# Patient Record
Sex: FEMALE | Race: ASIAN | NOT HISPANIC OR LATINO | URBAN - METROPOLITAN AREA
[De-identification: names, ages, dates, MRNs, and addresses within clinical notes are randomized per-mention and may not be internally consistent; named-entity substitution may affect disease eponyms.]

---

## 2017-07-13 ENCOUNTER — EMERGENCY (EMERGENCY)
Facility: HOSPITAL | Age: 47
LOS: 1 days | Discharge: HOME | End: 2017-07-13
Attending: EMERGENCY MEDICINE | Admitting: EMERGENCY MEDICINE
Payer: COMMERCIAL

## 2017-07-13 VITALS
DIASTOLIC BLOOD PRESSURE: 85 MMHG | RESPIRATION RATE: 19 BRPM | SYSTOLIC BLOOD PRESSURE: 130 MMHG | TEMPERATURE: 98 F | OXYGEN SATURATION: 100 % | HEART RATE: 78 BPM | WEIGHT: 130.07 LBS

## 2017-07-13 LAB
ALBUMIN SERPL ELPH-MCNC: 4.2 G/DL — SIGNIFICANT CHANGE UP (ref 3.3–5)
ALP SERPL-CCNC: 70 U/L — SIGNIFICANT CHANGE UP (ref 40–120)
ALT FLD-CCNC: 9 U/L — SIGNIFICANT CHANGE UP (ref 4–33)
AST SERPL-CCNC: 17 U/L — SIGNIFICANT CHANGE UP (ref 4–32)
BASOPHILS # BLD AUTO: 0.05 K/UL — SIGNIFICANT CHANGE UP (ref 0–0.2)
BASOPHILS NFR BLD AUTO: 0.6 % — SIGNIFICANT CHANGE UP (ref 0–2)
BILIRUB SERPL-MCNC: 0.3 MG/DL — SIGNIFICANT CHANGE UP (ref 0.2–1.2)
BLD GP AB SCN SERPL QL: NEGATIVE — SIGNIFICANT CHANGE UP
BUN SERPL-MCNC: 12 MG/DL — SIGNIFICANT CHANGE UP (ref 7–23)
CALCIUM SERPL-MCNC: 8.7 MG/DL — SIGNIFICANT CHANGE UP (ref 8.4–10.5)
CHLORIDE SERPL-SCNC: 104 MMOL/L — SIGNIFICANT CHANGE UP (ref 98–107)
CO2 SERPL-SCNC: 26 MMOL/L — SIGNIFICANT CHANGE UP (ref 22–31)
CREAT SERPL-MCNC: 0.69 MG/DL — SIGNIFICANT CHANGE UP (ref 0.5–1.3)
EOSINOPHIL # BLD AUTO: 0.43 K/UL — SIGNIFICANT CHANGE UP (ref 0–0.5)
EOSINOPHIL NFR BLD AUTO: 5.1 % — SIGNIFICANT CHANGE UP (ref 0–6)
GLUCOSE SERPL-MCNC: 94 MG/DL — SIGNIFICANT CHANGE UP (ref 70–99)
HBA1C BLD-MCNC: 5.8 % — HIGH (ref 4–5.6)
HCT VFR BLD CALC: 24 % — LOW (ref 34.5–45)
HGB BLD-MCNC: 6.9 G/DL — CRITICAL LOW (ref 11.5–15.5)
IMM GRANULOCYTES # BLD AUTO: 0.03 # — SIGNIFICANT CHANGE UP
IMM GRANULOCYTES NFR BLD AUTO: 0.4 % — SIGNIFICANT CHANGE UP (ref 0–1.5)
IRON SATN MFR SERPL: 14 UG/DL — LOW (ref 30–160)
IRON SATN MFR SERPL: 394 UG/DL — SIGNIFICANT CHANGE UP (ref 140–530)
LYMPHOCYTES # BLD AUTO: 3.52 K/UL — HIGH (ref 1–3.3)
LYMPHOCYTES # BLD AUTO: 41.4 % — SIGNIFICANT CHANGE UP (ref 13–44)
MCHC RBC-ENTMCNC: 18.4 PG — LOW (ref 27–34)
MCHC RBC-ENTMCNC: 28.8 % — LOW (ref 32–36)
MCV RBC AUTO: 64 FL — LOW (ref 80–100)
MONOCYTES # BLD AUTO: 0.7 K/UL — SIGNIFICANT CHANGE UP (ref 0–0.9)
MONOCYTES NFR BLD AUTO: 8.2 % — SIGNIFICANT CHANGE UP (ref 2–14)
NEUTROPHILS # BLD AUTO: 3.78 K/UL — SIGNIFICANT CHANGE UP (ref 1.8–7.4)
NEUTROPHILS NFR BLD AUTO: 44.3 % — SIGNIFICANT CHANGE UP (ref 43–77)
NRBC # FLD: 0 — SIGNIFICANT CHANGE UP
OB PNL STL: NEGATIVE — SIGNIFICANT CHANGE UP
PLATELET # BLD AUTO: 502 K/UL — HIGH (ref 150–400)
PMV BLD: 10.1 FL — SIGNIFICANT CHANGE UP (ref 7–13)
POTASSIUM SERPL-MCNC: 3.7 MMOL/L — SIGNIFICANT CHANGE UP (ref 3.5–5.3)
POTASSIUM SERPL-SCNC: 3.7 MMOL/L — SIGNIFICANT CHANGE UP (ref 3.5–5.3)
PROT SERPL-MCNC: 7.7 G/DL — SIGNIFICANT CHANGE UP (ref 6–8.3)
RBC # BLD: 3.75 M/UL — LOW (ref 3.8–5.2)
RBC # FLD: 19.1 % — HIGH (ref 10.3–14.5)
RETICS #: 39.9 10X3/UL — SIGNIFICANT CHANGE UP (ref 17–73)
RETICS/RBC NFR: 1.1 % — SIGNIFICANT CHANGE UP (ref 0.5–2.5)
REVIEW TO FOLLOW: YES — SIGNIFICANT CHANGE UP
RH IG SCN BLD-IMP: POSITIVE — SIGNIFICANT CHANGE UP
RH IG SCN BLD-IMP: POSITIVE — SIGNIFICANT CHANGE UP
SODIUM SERPL-SCNC: 141 MMOL/L — SIGNIFICANT CHANGE UP (ref 135–145)
UIBC SERPL-MCNC: 380 UG/DL — HIGH (ref 110–370)
WBC # BLD: 8.51 K/UL — SIGNIFICANT CHANGE UP (ref 3.8–10.5)
WBC # FLD AUTO: 8.51 K/UL — SIGNIFICANT CHANGE UP (ref 3.8–10.5)

## 2017-07-13 PROCEDURE — 99220: CPT | Mod: 25

## 2017-07-13 NOTE — ED CDU PROVIDER NOTE - OBJECTIVE STATEMENT
Jose Antonio att: 47F h/o anemia nos, fibroid sent in by pcp for symptomatic anemia and hb 6.7. Patient reports hb trended "for years" by pcp. Lately patient feels lighteaded, cannot stand for prolonged periods, and feels sob with rest or walking. PCP gabi labs Hb 6.7. Denies loc, cp, palpitations, melena, brpbr, menorrhagia, prior prbc transfusion, chemo. CDU for 2 unit prbc transfusion. Jose Antonio att: 47F h/o anemia nos, fibroid sent in by pcp for symptomatic anemia and hb 6.7. Patient reports hb trended "for years" by pcp. Lately patient feels lighteaded, cannot stand for prolonged periods, and feels sob with rest or walking. PCP gabi labs Hb 6.7. Denies loc, cp, palpitations, melena, brpbr, menorrhagia, prior prbc transfusion, chemo. CDU for 2 unit prbc transfusion.    CDU JAVI Salas Note-----  ED Provider Note reviewed per above; pt is a 46 yo female with PMH of anemia (no prior hx/o transfusion) and fibroids, who presented to the ED as noted above.  Pt. was evaluated in the ED and sent to CDU for transfusion of PRBC’s per orders, repeat labs, observation and reassessment.  On CDU arrival, pt has no active c/o; pt denies hx/o chest pain or syncope.  No active vaginal bleeding / spotting.  CDU plan discussed with pt who verbalizes agreement with plan.

## 2017-07-13 NOTE — ED PROVIDER NOTE - PROGRESS NOTE DETAILS
I performed a face to face bedside interview with patient regarding history of present illness, review of symptoms and past medical history. I completed an independent physical exam.  I have discussed patient's plan of care with PA.   I agree with note as stated above, having amended the EMR as needed to reflect my findings. I have discussed the assessment and plan of care.  This includes during the time I functioned as the attending physician for this patient.  pt w/o acute complaint  here for transfusion for symptomatic anemia  with weakness and COPE on climbing stairs for several mos  exam here  clear lungs  card RRR S1S@  abd nontender  fullness low abd  received TF  repeat CBC pending

## 2017-07-13 NOTE — ED CDU PROVIDER NOTE - INDICATION FOR OBSERVATION
Therapeutic Intensity Therapeutic Intensity/Transfusion of PRBC’s per orders, repeat labs, observation and reassessment.

## 2017-07-13 NOTE — ED PROVIDER NOTE - PHYSICAL EXAMINATION
Jose Antonio att: nad, aaox3, ctabl, rrr, s1s2, abd ntnd large fibroid palpable to level of umbilicus, neg le edema

## 2017-07-13 NOTE — ED PROVIDER NOTE - ATTENDING CONTRIBUTION TO CARE
Dr. Brady: I performed a face to face bedside interview with patient regarding history of present illness, review of symptoms and past medical history. I completed an independent physical exam.  I have discussed patient's plan of care with PA.   I agree with note as stated above, having amended the EMR as needed to reflect my findings.   This includes HISTORY OF PRESENT ILLNESS, HIV, PAST MEDICAL/SURGICAL/FAMILY/SOCIAL HISTORY, ALLERGIES AND HOME MEDICATIONS, REVIEW OF SYSTEMS, PHYSICAL EXAM, and any PROGRESS NOTES during the time I functioned as the attending physician for this patient. HPI above as by me. PE above as by me. DDX symptomatic anemia PLAN cbc diff, cmp, hcg, occult, prbc transfuse x 2 units in CDU. TIBC, ferritin, retic drawn for outpatient hematology w/up. Patient not actively vag or rectal bleeding, if occult positive, outpatient gi workup.

## 2017-07-13 NOTE — ED ADULT NURSE NOTE - OBJECTIVE STATEMENT
Pt received into intake room 2 sent by PCP for blood transfusion. Pt states she had blood work done and had HGB of 6.7; pt also co generalized weakness, fatigue, SOB, light headed. Pt has PMH anemia, refused blood transfusions in past. Pt A&Ox4, in NAD. MD evaluated, VS as noted, 20 G Iv inserted into R AC, labs sent. Family at bedside, call bell within reach, comfort measures provided, will continue to monitor for safety and comfort.

## 2017-07-13 NOTE — ED PROVIDER NOTE - OBJECTIVE STATEMENT
47 year old female PMHx of fibroids (not actively bleeding) sent in by her PCP for hbg of 6.7 pw generalized weakness, lightheadedness SOB, fatigue. The patient has had these symptoms for several months - never has been transfused because the patient has refused. Her PCP has been trending her H/H and last month it was 7 - two months ago it was 6.2. LMP 7/4/2017. Denies n/v/f/c, CP, abdominal pain, melena, hematochezia, dysuria, hematuria, vaginal bleeding. 47 year old female PMHx of fibroids (not actively bleeding) sent in by her PCP for hbg of 6.7 pw generalized weakness, lightheadedness SOB, fatigue. The patient has had these symptoms for several months - never has been transfused because the patient has refused. Her PCP has been trending her H/H and last month it was 7 - two months ago it was 6.2. LMP 7/4/2017. Denies n/v/f/c, CP, abdominal pain, melena, hematochezia, dysuria, hematuria, vaginal bleeding.    Jose Antonio att: 47F h/o anemia nos, fibroid sent in by pcp for symptomatic anemia and hb 6.7. Patient reports hb trended "for years" by pcp. Lately patient feels lighteaded, cannot stand for prolonged periods, and feels sob with rest or walking. PCP gabi labs Hb 6.7. Denies loc, cp, palpitations, melena, brpbr, menorrhagia, prior prbc transfusion, chemo.

## 2017-07-13 NOTE — ED CDU PROVIDER NOTE - PROGRESS NOTE DETAILS
CDU PA Josue Addendum----  Pt. resting comfortably in interim; no issues to date; will continue to monitor / reassess.  Pt. will be signed out to CDU day PA and attending at 0700 hrs. Pepe Gordon: Pt resting comfortably. H/H improved- to 10.5/ 34.7. Pt feeling better. Pt ambulating with no difficulty. Pt stable for discharge and to follow up with Dr. Maame chun. Carla attending I performed a face to face bedside interview with patient regarding history of present illness, review of symptoms and past medical history. I completed an independent physical exam.  I have discussed patient's plan of care with PA.   I agree with note as stated above, having amended the EMR as needed to reflect my findings. I have discussed the assessment and plan of care.  This includes during the time I functioned as the attending physician for this patient.  pt inCDU for symptomatic anemia    c/o several mos of COPE  no acute change in sxs  Pt with h/o fibroid uterus  exam  pt in NAD clear lungs  card RRR S1S2  abd slight  low abd fullness  nontender  ambulatory in ED  EKG  shallow ant TW  no old for comp  bedside ECHO  nl LV size and function   no sign of RV strain Locurto attending  pt s/p transfusion  feels improved  will follow up with outpt MD Dunbar for discharge

## 2017-07-13 NOTE — ED PROVIDER NOTE - MEDICAL DECISION MAKING DETAILS
47 year old female PMHx of fibroids (not actively bleeding) sent in by her PCP for hbg of 6.7 pw generalized weakness, lightheadedness SOB, fatigue.   Plan: cbc, cmp, type and screen, fecal occult

## 2017-07-14 VITALS
SYSTOLIC BLOOD PRESSURE: 106 MMHG | HEART RATE: 56 BPM | RESPIRATION RATE: 16 BRPM | OXYGEN SATURATION: 98 % | TEMPERATURE: 99 F | DIASTOLIC BLOOD PRESSURE: 65 MMHG

## 2017-07-14 LAB
ANISOCYTOSIS BLD QL: SIGNIFICANT CHANGE UP
BASOPHILS # BLD AUTO: 0.1 K/UL — SIGNIFICANT CHANGE UP (ref 0–0.2)
BASOPHILS NFR BLD AUTO: 1.4 % — SIGNIFICANT CHANGE UP (ref 0–2)
DACRYOCYTES BLD QL SMEAR: SLIGHT — SIGNIFICANT CHANGE UP
EOSINOPHIL # BLD AUTO: 0.33 K/UL — SIGNIFICANT CHANGE UP (ref 0–0.5)
EOSINOPHIL NFR BLD AUTO: 4.7 % — SIGNIFICANT CHANGE UP (ref 0–6)
FERRITIN SERPL-MCNC: 5.15 NG/ML — LOW (ref 15–150)
HCG SERPL-ACNC: < 5 MIU/ML — SIGNIFICANT CHANGE UP
HCT VFR BLD CALC: 34.7 % — SIGNIFICANT CHANGE UP (ref 34.5–45)
HGB BLD-MCNC: 10.5 G/DL — LOW (ref 11.5–15.5)
HYPOCHROMIA BLD QL: SIGNIFICANT CHANGE UP
IMM GRANULOCYTES # BLD AUTO: 0.02 # — SIGNIFICANT CHANGE UP
IMM GRANULOCYTES NFR BLD AUTO: 0.3 % — SIGNIFICANT CHANGE UP (ref 0–1.5)
LYMPHOCYTES # BLD AUTO: 2.82 K/UL — SIGNIFICANT CHANGE UP (ref 1–3.3)
LYMPHOCYTES # BLD AUTO: 39.8 % — SIGNIFICANT CHANGE UP (ref 13–44)
MANUAL SMEAR VERIFICATION: YES — SIGNIFICANT CHANGE UP
MCHC RBC-ENTMCNC: 21 PG — LOW (ref 27–34)
MCHC RBC-ENTMCNC: 30.3 % — LOW (ref 32–36)
MCV RBC AUTO: 69.5 FL — LOW (ref 80–100)
MICROCYTES BLD QL: SIGNIFICANT CHANGE UP
MONOCYTES # BLD AUTO: 0.63 K/UL — SIGNIFICANT CHANGE UP (ref 0–0.9)
MONOCYTES NFR BLD AUTO: 8.9 % — SIGNIFICANT CHANGE UP (ref 2–14)
NEUTROPHILS # BLD AUTO: 3.19 K/UL — SIGNIFICANT CHANGE UP (ref 1.8–7.4)
NEUTROPHILS NFR BLD AUTO: 44.9 % — SIGNIFICANT CHANGE UP (ref 43–77)
NRBC # FLD: 0 — SIGNIFICANT CHANGE UP
PLATELET # BLD AUTO: 446 K/UL — HIGH (ref 150–400)
PLATELET COUNT - ESTIMATE: SIGNIFICANT CHANGE UP
PMV BLD: 10.4 FL — SIGNIFICANT CHANGE UP (ref 7–13)
POIKILOCYTOSIS BLD QL AUTO: SLIGHT — SIGNIFICANT CHANGE UP
RBC # BLD: 4.99 M/UL — SIGNIFICANT CHANGE UP (ref 3.8–5.2)
RBC # FLD: 23.5 % — HIGH (ref 10.3–14.5)
REVIEW TO FOLLOW: YES — SIGNIFICANT CHANGE UP
WBC # BLD: 7.09 K/UL — SIGNIFICANT CHANGE UP (ref 3.8–10.5)
WBC # FLD AUTO: 7.09 K/UL — SIGNIFICANT CHANGE UP (ref 3.8–10.5)

## 2017-07-14 PROCEDURE — 99217: CPT

## 2017-07-14 PROCEDURE — 93308 TTE F-UP OR LMTD: CPT | Mod: 26

## 2017-07-14 RX ORDER — CYCLOBENZAPRINE HYDROCHLORIDE 10 MG/1
10 TABLET, FILM COATED ORAL ONCE
Qty: 0 | Refills: 0 | Status: COMPLETED | OUTPATIENT
Start: 2017-07-14 | End: 2017-07-14

## 2017-07-14 RX ADMIN — CYCLOBENZAPRINE HYDROCHLORIDE 10 MILLIGRAM(S): 10 TABLET, FILM COATED ORAL at 03:41

## 2017-07-14 NOTE — ED PROCEDURE NOTE - PROCEDURE ADDITIONAL DETAILS
normal LV function no sign of RV strain   no pericardial effusion    RV free wall velocity nl  RV size wnl

## 2018-10-31 PROBLEM — D64.9 ANEMIA, UNSPECIFIED: Chronic | Status: ACTIVE | Noted: 2017-07-13

## 2018-10-31 PROBLEM — D25.9 LEIOMYOMA OF UTERUS, UNSPECIFIED: Chronic | Status: ACTIVE | Noted: 2017-07-13

## 2018-11-05 ENCOUNTER — OUTPATIENT (OUTPATIENT)
Dept: OUTPATIENT SERVICES | Facility: HOSPITAL | Age: 48
LOS: 1 days | End: 2018-11-05
Payer: SELF-PAY

## 2018-11-05 ENCOUNTER — APPOINTMENT (OUTPATIENT)
Dept: MRI IMAGING | Facility: CLINIC | Age: 48
End: 2018-11-05

## 2018-11-05 DIAGNOSIS — Z00.8 ENCOUNTER FOR OTHER GENERAL EXAMINATION: ICD-10-CM

## 2018-11-05 PROCEDURE — 73721 MRI JNT OF LWR EXTRE W/O DYE: CPT | Mod: 26,RT

## 2018-11-05 PROCEDURE — 73721 MRI JNT OF LWR EXTRE W/O DYE: CPT

## 2020-11-16 ENCOUNTER — APPOINTMENT (OUTPATIENT)
Dept: OBGYN | Facility: CLINIC | Age: 50
End: 2020-11-16
Payer: COMMERCIAL

## 2020-11-16 ENCOUNTER — RESULT REVIEW (OUTPATIENT)
Age: 50
End: 2020-11-16

## 2020-11-16 PROCEDURE — 99072 ADDL SUPL MATRL&STAF TM PHE: CPT

## 2020-11-16 PROCEDURE — 99203 OFFICE O/P NEW LOW 30 MIN: CPT

## 2022-06-20 ENCOUNTER — APPOINTMENT (OUTPATIENT)
Dept: ORTHOPEDIC SURGERY | Facility: CLINIC | Age: 52
End: 2022-06-20
Payer: COMMERCIAL

## 2022-06-20 VITALS — BODY MASS INDEX: 24.74 KG/M2 | WEIGHT: 126 LBS | HEIGHT: 60 IN

## 2022-06-20 DIAGNOSIS — Z78.9 OTHER SPECIFIED HEALTH STATUS: ICD-10-CM

## 2022-06-20 PROCEDURE — 99203 OFFICE O/P NEW LOW 30 MIN: CPT

## 2022-06-20 PROCEDURE — 99204 OFFICE O/P NEW MOD 45 MIN: CPT

## 2022-06-20 NOTE — IMAGING
[Right] : right knee [All Views] : anteroposterior, lateral, skyline, and anteroposterior standing [There are no fractures, subluxations or dislocations. No significant abnormalities are seen] : There are no fractures, subluxations or dislocations. No significant abnormalities are seen [de-identified] : The patient is a well appearing 52 year year old female of their stated age. \par Patient ambulates with a normal gait. \par Negative straight leg raise bilateral \par \par Effected Knee:   Right                    \par ROM:  0-145 degrees \par Lachman: Negative \par Pivot Shift: Negative \par Anterior Drawer: Negative \par Posterior Drawer / Sag:Negative \par Varus Stress 0 degrees: Stable \par Varus Stress 30 degrees: Stable \par Valgus Stress 0 degrees: Stable \par Valgus Stress 30 degrees: Stable \par Medial Yolande: Negative \par Lateral Yolande: +\par Patella Glide: 2+ \par Patella Apprehension: Negative \par Patella Grind: Negative \par \par Palpation: \par Medial Joint Line: Nontender \par Lateral Joint Line: tender \par Medial Collateral Ligament: Nontender \par Lateral Collateral Ligament/PLC: Nontender \par Distal Femur: Nontender \par Proximal Tibia: Nontender \par Tibial Tubercle: Nontender \par Distal Pole Patella: Nontender \par Quadriceps Tendon: Nontender &  Intact \par Patella Tendon: Nontender &  Intact \par Medial Distal Hamstring/PES: Nontender \par Lateral Distal Hamstring: Nontender & Stable \par Iliotibial Band: tender \par Medial Patellofemoral Ligament: Nontender \par Proximal GSC-Plantaris: Nontender \par Calf: Supple & Nontender \par \par Inspection: \par Deformity: No \par Erythema: No \par Ecchymosis: No \par Abrasions: No \par Effusion: Mild\par Prepatella Bursitis: No \par \par Neurologic Exam: \par Sensation L4-S1: Grossly Intact \par \par Motor Exam: \par Quadriceps: 5 out of 5 \par Hamstrings: 5 out of 5 \par EHL: 5 out of 5 \par FHL: 5 out of 5 \par TA: 5 out of 5 \par GS: 5 out of 5 \par \par Circulatory/Pulses: \par Dorsalis Pedis: 2+ \par Posterior Tibialis: 2+ \par \par Additional Pertinent Findings: None \par \par Contralateral Knee:                                \par ROM: 0-145 degrees \par Other Pertinent Findings: None\par \par Assessment: The patient is a 52 year year old female  with right knee pain s/p slip and fall and radiographic and physical exam findings consistent with possible LMT\par \par The patient’s condition is acute \par Documents/Results Reviewed Today: XRs right knee\par Tests/Studies Independently Interpreted Today:  XRs right knee are normal \par Pertinent findings include:  IT band tenderness, + lateral yolande, lateral joint line tenderness, mild effusion \par Confounding medical conditions/concerns:  None\par \par Plan: Patient will obtain MRI of right knee to eval for LMT. Start HEP and ice.   \par Tests Ordered: MRI right knee to eval for LMT\par Prescription Medications Ordered: None \par Braces/DME Ordered: None \par Activity/Work/Sports Status: OOW \par Additional Instructions:  as tolerated\par \par Follow-Up: after MRI \par \par \par The patient's current medication management of their orthopedic diagnosis was reviewed today:\par (1) We discussed a comprehensive treatment plan that included possible pharmaceutical management involving the use of prescription strength medications including but not limited to options such as oral Naprosyn 500mg BID, once daily Meloxicam 15 mg, or 500-650 mg Tylenol versus over the counter oral medications and topical prescription NSAID Pennsaid vs over the counter Voltaren gel.\par (2) There is a moderate risk of morbidity with further treatment, especially from use of prescription strength medications and possible side effects of these medications which include upset stomach with oral medications, skin reactions to topical medications and cardiac/renal issues with long term use.\par (3) I recommended that the patient follow-up with their medical physician to discuss any significant specific potential issues with long term medication use such as interactions with current medications or with exacerbation of underlying medical comorbidities.\par (4) The benefits and risks associated with use of injectable, oral or topical, prescription and over the counter anti-inflammatory medications were discussed with the patient. The patient voiced understanding of the risks including but not limited to bleeding, stroke, kidney dysfunction, heart disease, and were referred to the black box warning label for further information.\par \par Rosi RESTREPO attest that this documentation has been prepared under the direction and in the presence of provider Dr. Cleveland.\par \par The documentation recorded by the scribe accurately reflects the service I personally performed and the decisions made by me.\par

## 2022-06-20 NOTE — HISTORY OF PRESENT ILLNESS
[de-identified] : The patient is a 52 year old right hand dominant female  who presents today complaining of right knee pain.  \par Date of Injury/Onset: 3 weeks ago\par Pain:    At Rest: 4/10 \par With Activity:  7/10 \par Mechanism of injury: slipped and fell on water and fell on her left knee\par This is a Work Related Injury being treated under Worker's Compensation.\par This is [not] an athletic injury occurring associated with an interscholastic or organized sports team.\par Quality of symptoms: Sharp pain over lateral knee\par Improves with: rest\par Worse with: Pain is constant, lying on involved side, walking, running, increased activity\par Prior treatment: [facility]\par Prior Imaging:X-ray\par Reports Available For Review Today: None\par Out of work/sport: [Yes], since friday\par School/Sport/Position/Occupation: Dialysis nurse\par Additional Information: [None]

## 2022-07-11 ENCOUNTER — APPOINTMENT (OUTPATIENT)
Dept: ORTHOPEDIC SURGERY | Facility: CLINIC | Age: 52
End: 2022-07-11

## 2022-07-11 VITALS — WEIGHT: 126 LBS | HEIGHT: 60 IN | BODY MASS INDEX: 24.74 KG/M2

## 2022-07-11 PROCEDURE — 99072 ADDL SUPL MATRL&STAF TM PHE: CPT

## 2022-07-11 PROCEDURE — 99214 OFFICE O/P EST MOD 30 MIN: CPT

## 2022-07-11 NOTE — HISTORY OF PRESENT ILLNESS
[de-identified] : The patient is a 52 year old right hand dominant female  who presents today complaining of right knee pain.  \par Date of Injury/Onset: 10/2021...reinjured 3 weeks ago\par Pain:    At Rest: 4/10 \par With Activity:  7/10 \par Mechanism of injury: slipped and fell on water and fell on her left knee\par This is a Work Related Injury being treated under Worker's Compensation.\par This is not an athletic injury occurring associated with an interscholastic or organized sports team.\par Quality of symptoms: Sharp pain over lateral knee\par Improves with: rest\par Worse with: Pain is constant, lying on involved side, walking, running, increased activity\par Treatment/Imaging/Studies Since Last Visit: MRI\par 	Reports Available For Review Today: MRI report\par Out of work/sport: out of work since 6/17/22\par School/Sport/Position/Occupation: Dialysis nurse\par Additional Information: none

## 2022-07-11 NOTE — IMAGING
Patient used call light; writer answered; patient asked how long this will all take.  Writer told patient about 30 min for results  Patient stated \"Can I have something to eat\" patient continues to remove blood pressure cuff and pulse oxygen monitor.   Writer informed patient no food or drink till the test results come back.   Patient stated \"if its not done in like 25 min im debating whether or not I'm gonna go to [facility] or not\"  Writer asked patient if he was going to leave and patient stated \"not yet but Im debating on it\"  MD aware   [de-identified] : The patient is a well appearing 52 year year old female of their stated age. \par Patient ambulates with a normal gait. \par Negative straight leg raise bilateral \par \par Effected Knee:   Right                    \par ROM:  0-145 degrees \par Lachman: Negative \par Pivot Shift: Negative \par Anterior Drawer: Negative \par Posterior Drawer / Sag:Negative \par Varus Stress 0 degrees: Stable \par Varus Stress 30 degrees: Stable \par Valgus Stress 0 degrees: Stable \par Valgus Stress 30 degrees: Stable \par Medial Yolande: Negative \par Lateral Yolande: +\par Patella Glide: 2+ \par Patella Apprehension: Negative \par Patella Grind: Negative \par \par Palpation: \par Medial Joint Line: Nontender \par Lateral Joint Line: tender \par Medial Collateral Ligament: Nontender \par Lateral Collateral Ligament/PLC: Nontender \par Distal Femur: Nontender \par Proximal Tibia: Nontender \par Tibial Tubercle: Nontender \par Distal Pole Patella: Nontender \par Quadriceps Tendon: Nontender &  Intact \par Patella Tendon: Nontender &  Intact \par Medial Distal Hamstring/PES: Nontender \par Lateral Distal Hamstring: Nontender & Stable \par Iliotibial Band: tender \par Medial Patellofemoral Ligament: Nontender \par Proximal GSC-Plantaris: Nontender \par Calf: Supple & Nontender \par \par Inspection: \par Deformity: No \par Erythema: No \par Ecchymosis: No \par Abrasions: No \par Effusion: Mild\par Prepatella Bursitis: No \par \par Neurologic Exam: \par Sensation L4-S1: Grossly Intact \par \par Motor Exam: \par Quadriceps: 5 out of 5 \par Hamstrings: 5 out of 5 \par EHL: 5 out of 5 \par FHL: 5 out of 5 \par TA: 5 out of 5 \par GS: 5 out of 5 \par \par Circulatory/Pulses: \par Dorsalis Pedis: 2+ \par Posterior Tibialis: 2+ \par \par Additional Pertinent Findings: None \par \par Contralateral Knee:                                \par ROM: 0-145 degrees \par Other Pertinent Findings: None\par \par Assessment: The patient is a 52 year year old female with right knee pain s/p slip and fall and radiographic and physical exam findings consistent with LMT\par \par The patient’s condition is acute \par Documents/Results Reviewed Today: MRI right knee\par Tests/Studies Independently Interpreted Today:  MRI right knee reveals lateral meniscus tear with parameniscal cyst\par Pertinent findings include:  IT band tenderness, + lateral yolande, lateral joint line tenderness, mild effusion \par Confounding medical conditions/concerns:  None\par \par Plan: Discussed non-operative and operative treatment options including right knee arthroscopic partial lateral meniscectomy with debridement of parameniscal cyst and any indicated procedures. Patient made aware of the risks and benefits of surgical intervention. All questions and concerns regarding the surgery were addressed. Went over the recovery timeline and expected outcomes following surgery. Patient elected to move forward with the surgical procedure.\par Tests Ordered: Preop and Covid testing\par Prescription Medications Ordered: None \par Braces/DME Ordered: None \par Activity/Work/Sports Status: OOW \par Additional Instructions: as tolerated\par Follow-Up: 2wks post-op\par \par \par The patient's current medication management of their orthopedic diagnosis was reviewed today:\par (1) We discussed a comprehensive treatment plan that included possible pharmaceutical management involving the use of prescription strength medications including but not limited to options such as oral Naprosyn 500mg BID, once daily Meloxicam 15 mg, or 500-650 mg Tylenol versus over the counter oral medications and topical prescription NSAID Pennsaid vs over the counter Voltaren gel.\par (2) There is a moderate risk of morbidity with further treatment, especially from use of prescription strength medications and possible side effects of these medications which include upset stomach with oral medications, skin reactions to topical medications and cardiac/renal issues with long term use.\par (3) I recommended that the patient follow-up with their medical physician to discuss any significant specific potential issues with long term medication use such as interactions with current medications or with exacerbation of underlying medical comorbidities.\par (4) The benefits and risks associated with use of injectable, oral or topical, prescription and over the counter anti-inflammatory medications were discussed with the patient. The patient voiced understanding of the risks including but not limited to bleeding, stroke, kidney dysfunction, heart disease, and were referred to the black box warning label for further information.\par \par Consent:  Conservative treatment, nontreatment, nonsurgical intervention and surgical intervention treatment options have been reviewed with the patient.  The patient continues to be symptomatic and has failed conservative treatment, and elects to move forward with surgical intervention.  The patient is indicated for right knee arthroscopic partial lateral meniscectomy with debridement of parameniscal cyst and all indicated procedures. As such the alternatives, benefits and risks, of the above procedure, including but not limited to bleeding, infection, neurovascular injury, loss of limb, loss of life,  DVT, PE, RSD, inability to return to previous level of activity, inability to return to previous level of employment, advancement of or to osteoarthritic changes, joint instability or motion loss, hardware failure or migration, cyst recurrence, failure to resolve all symptoms, failure to return to sports and need for further procedures, as well as specific risk of need for future joint arthroplasty were discussed with the patient and/or their legal guardian who agreed to move forward with surgical intervention.  They have reviewed and signed the consent form today after expressing understanding of the above documented conversation. The patient or their representative will contact my office as instructed on the preoperative instruction sheet they received today to schedule surgery in a timely manner as discussed.\par Over 25 minutes were spent on this encounter including time with the patient and over 15 minutes spent on counseling and coordination of care.\par \par \par \par I, Kevin Broussard, attest that this documentation has been prepared under the direction and in the presence of Provider Dr. Cleveland\par \par The documentation recorded by the scribe accurately reflects the service I personally performed and the decisions made by me.\par \par \par

## 2022-07-11 NOTE — DATA REVIEWED
[MRI] : MRI [Right] : of the right [Knee] : knee [Report was reviewed and noted in the chart] : The report was reviewed and noted in the chart [I independently reviewed and interpreted images and report] : I independently reviewed and interpreted images and report [I reviewed the films/CD and additionally noted] : I reviewed the films/CD and additionally noted [FreeTextEntry1] : lateral meniscus tear with para-meniscal cyst

## 2022-07-22 ENCOUNTER — APPOINTMENT (OUTPATIENT)
Dept: ORTHOPEDIC SURGERY | Facility: CLINIC | Age: 52
End: 2022-07-22

## 2022-07-22 VITALS — HEIGHT: 60 IN | BODY MASS INDEX: 24.74 KG/M2 | WEIGHT: 126 LBS

## 2022-07-22 DIAGNOSIS — M23.302 OTHER MENISCUS DERANGEMENTS, UNSPECIFIED LATERAL MENISCUS, UNSPECIFIED KNEE: ICD-10-CM

## 2022-07-22 PROCEDURE — 99214 OFFICE O/P EST MOD 30 MIN: CPT

## 2022-07-22 NOTE — IMAGING
[de-identified] : The patient is a well appearing 52 year year old female of their stated age. \par Patient ambulates with a normal gait. \par Negative straight leg raise bilateral \par \par Effected Knee:   Right                    \par ROM:  0-145 degrees \par Lachman: Negative \par Pivot Shift: Negative \par Anterior Drawer: Negative \par Posterior Drawer / Sag:Negative \par Varus Stress 0 degrees: Stable \par Varus Stress 30 degrees: Stable \par Valgus Stress 0 degrees: Stable \par Valgus Stress 30 degrees: Stable \par Medial Akiko: Negative \par Lateral Akiko: +\par Patella Glide: 2+ \par Patella Apprehension: Negative \par Patella Grind: Negative \par \par Palpation: \par Medial Joint Line: Nontender \par Lateral Joint Line: tender \par Medial Collateral Ligament: Nontender \par Lateral Collateral Ligament/PLC: Nontender \par Distal Femur: Nontender \par Proximal Tibia: Nontender \par Tibial Tubercle: Nontender \par Distal Pole Patella: Nontender \par Quadriceps Tendon: Nontender &  Intact \par Patella Tendon: Nontender &  Intact \par Medial Distal Hamstring/PES: Nontender \par Lateral Distal Hamstring: Nontender & Stable \par Iliotibial Band: tender \par Medial Patellofemoral Ligament: Nontender \par Proximal GSC-Plantaris: Nontender \par Calf: Supple & Nontender \par \par Inspection: \par Deformity: No \par Erythema: No \par Ecchymosis: No \par Abrasions: No \par Effusion: Mild\par Prepatella Bursitis: No \par \par Neurologic Exam: \par Sensation L4-S1: Grossly Intact \par \par Motor Exam: \par Quadriceps: 5 out of 5 \par Hamstrings: 5 out of 5 \par EHL: 5 out of 5 \par FHL: 5 out of 5 \par TA: 5 out of 5 \par GS: 5 out of 5 \par \par Circulatory/Pulses: \par Dorsalis Pedis: 2+ \par Posterior Tibialis: 2+ \par \par Additional Pertinent Findings: None \par \par Contralateral Knee:                                \par ROM: 0-145 degrees \par Other Pertinent Findings: None\par \par Assessment: The patient is a 52 year year old female with right knee pain s/p slip and fall and radiographic and physical exam findings consistent with LMT\par \par The patient’s condition is acute \par Documents/Results Reviewed Today: MRI right knee\par Tests/Studies Independently Interpreted Today:  MRI right knee reveals lateral meniscus tear with parameniscal cyst\par Pertinent findings include:  IT band tenderness, + lateral akiko, lateral joint line tenderness, mild effusion \par Confounding medical conditions/concerns:  None\par \par Plan: Discussed non-operative and operative treatment options including right knee arthroscopic partial lateral meniscectomy with debridement of parameniscal cyst and any indicated procedures. Patient made aware of the risks and benefits of surgical intervention. All questions and concerns regarding the surgery were addressed. Went over the recovery timeline and expected outcomes following surgery. Patient elected to move forward with the surgical procedure.\par Tests Ordered: Preop and Covid testing\par Prescription Medications Ordered: None \par Braces/DME Ordered: None \par Activity/Work/Sports Status: OOW \par Additional Instructions: as tolerated\par Follow-Up: 2wks post-op\par \par \par The patient's current medication management of their orthopedic diagnosis was reviewed today:\par (1) We discussed a comprehensive treatment plan that included possible pharmaceutical management involving the use of prescription strength medications including but not limited to options such as oral Naprosyn 500mg BID, once daily Meloxicam 15 mg, or 500-650 mg Tylenol versus over the counter oral medications and topical prescription NSAID Pennsaid vs over the counter Voltaren gel.\par (2) There is a moderate risk of morbidity with further treatment, especially from use of prescription strength medications and possible side effects of these medications which include upset stomach with oral medications, skin reactions to topical medications and cardiac/renal issues with long term use.\par (3) I recommended that the patient follow-up with their medical physician to discuss any significant specific potential issues with long term medication use such as interactions with current medications or with exacerbation of underlying medical comorbidities.\par (4) The benefits and risks associated with use of injectable, oral or topical, prescription and over the counter anti-inflammatory medications were discussed with the patient. The patient voiced understanding of the risks including but not limited to bleeding, stroke, kidney dysfunction, heart disease, and were referred to the black box warning label for further information.\par \par Consent:  Conservative treatment, nontreatment, nonsurgical intervention and surgical intervention treatment options have been reviewed with the patient.  The patient continues to be symptomatic and has failed conservative treatment, and elects to move forward with surgical intervention.  The patient is indicated for right knee arthroscopic partial lateral meniscectomy with debridement of parameniscal cyst and all indicated procedures. As such the alternatives, benefits and risks, of the above procedure, including but not limited to bleeding, infection, neurovascular injury, loss of limb, loss of life,  DVT, PE, RSD, inability to return to previous level of activity, inability to return to previous level of employment, advancement of or to osteoarthritic changes, joint instability or motion loss, hardware failure or migration, cyst recurrence, failure to resolve all symptoms, failure to return to sports and need for further procedures, as well as specific risk of need for future joint arthroplasty were discussed with the patient and/or their legal guardian who agreed to move forward with surgical intervention.  They have reviewed and signed the consent form today after expressing understanding of the above documented conversation. The patient or their representative will contact my office as instructed on the preoperative instruction sheet they received today to schedule surgery in a timely manner as discussed.\par Over 25 minutes were spent on this encounter including time with the patient and over 15 minutes spent on counseling and coordination of care.\par \par I, Kasia Haddad, attest that this documentation has been prepared under the direction and in the presence of Provider Dr. Kristian Cleveland.\par \par The documentation recorded by the scribe accurately reflects the service I personally performed and the decisions made by me.\par \par

## 2022-07-22 NOTE — HISTORY OF PRESENT ILLNESS
[de-identified] : The patient is a 52 year old right hand dominant female  who presents today complaining of right knee pain.  \par Date of Injury/Onset: 10/2021...reinjured 3 weeks ago\par Pain:    At Rest: 4/10 \par With Activity:  7/10 \par Mechanism of injury: slipped and fell on water and fell on her left knee\par This is not Work Related Injury being treated under Worker's Compensation.\par This is not an athletic injury occurring associated with an interscholastic or organized sports team.\par Quality of symptoms: Sharp pain over lateral knee\par Improves with: rest\par Worse with: Pain is constant, lying on involved side, walking, running, increased activity\par Treatment/Imaging/Studies Since Last Visit: None\par 	Reports Available For Review Today: None\par Out of work/sport: out of work since 6/17/22\par School/Sport/Position/Occupation: Dialysis nurse\par Additional Information: none

## 2022-08-08 RX ORDER — HYDROCODONE BITARTRATE AND ACETAMINOPHEN 5; 325 MG/1; MG/1
5-325 TABLET ORAL
Qty: 30 | Refills: 0 | Status: ACTIVE | COMMUNITY
Start: 2022-08-08 | End: 1900-01-01

## 2022-08-08 RX ORDER — ONDANSETRON 4 MG/1
4 TABLET ORAL
Qty: 15 | Refills: 0 | Status: ACTIVE | COMMUNITY
Start: 2022-08-08 | End: 1900-01-01

## 2022-08-08 RX ORDER — DOCUSATE SODIUM 100 MG/1
100 CAPSULE ORAL 3 TIMES DAILY
Qty: 21 | Refills: 0 | Status: ACTIVE | COMMUNITY
Start: 2022-08-08 | End: 1900-01-01

## 2022-08-09 ENCOUNTER — APPOINTMENT (OUTPATIENT)
Dept: ORTHOPEDIC SURGERY | Facility: AMBULATORY SURGERY CENTER | Age: 52
End: 2022-08-09

## 2022-08-09 PROCEDURE — 29881 ARTHRS KNE SRG MNISECTMY M/L: CPT | Mod: AS,RT

## 2022-08-09 PROCEDURE — 29881 ARTHRS KNE SRG MNISECTMY M/L: CPT | Mod: RT

## 2022-08-22 ENCOUNTER — APPOINTMENT (OUTPATIENT)
Dept: ORTHOPEDIC SURGERY | Facility: CLINIC | Age: 52
End: 2022-08-22

## 2022-08-22 VITALS — BODY MASS INDEX: 24.74 KG/M2 | WEIGHT: 126 LBS | HEIGHT: 60 IN

## 2022-08-22 PROCEDURE — 99024 POSTOP FOLLOW-UP VISIT: CPT

## 2022-08-22 NOTE — PHYSICAL EXAM
[de-identified] : The patient is a well appearing 52 year old F of their stated age.\par \par Surgical site: Right knee\par  \par Incision sites: Well approximated, clean, dry, intact, without drainage, without erythema\par  \par Range of motion: 0-125\par  \par Motor Testin-/5 quad\par  \par Stability Testing: Limited by pain\par  \par Swelling/Effusion: Trace\par  \par Tenderness to palpation: None\par  \par Provocative testing: Limited by pain\par  \par Right Calf: soft and nontender\par Left Calf: soft and nontender\par  \par Neurovascular Examination: Grossly intact, 2+ distal pulses\par Contralateral Extremity: Examination grossly benign\par \par \par Assessment & Plan: The patient is approximately 2 weeks s/p right knee examination under anesthesia, arthroscopic partial lateral meniscectomy with decompression of parameniscal cyst with interval improvement (22). Sutures removed and Steri Strips applied today. The patient is instructed in wound management. The patient's post-op plan, protocol and activity modifications have been thoroughly discussed and the patient expressed understanding. Patient will continue physical therapy. The patient will control pain as discussed. The patient otherwise may advance activity as discussed. They will remain OOW. F/u 4wks. \par \par \par The patient's current medication management of their orthopedic diagnosis was reviewed today:\par (1) We discussed a comprehensive treatment plan that included possible pharmaceutical management involving the use of prescription strength medications including but not limited to options such as oral Naprosyn 500mg BID, once daily Meloxicam 15 mg, or 500-650 mg Tylenol versus over the counter oral medications and topical prescription NSAID Pennsaid vs over the counter Voltaren gel.\par (2) There is a moderate risk of morbidity with further treatment, especially from use of prescription strength medications and possible side effects of these medications which include upset stomach with oral medications, skin reactions to topical medications and cardiac/renal issues with long term use.\par (3) I recommended that the patient follow-up with their medical physician to discuss any significant specific potential issues with long term medication use such as interactions with current medications or with exacerbation of underlying medical comorbidities.\par (4) The benefits and risks associated with use of injectable, oral or topical, prescription and over the counter anti-inflammatory medications were discussed with the patient. The patient voiced understanding of the risks including but not limited to bleeding, stroke, kidney dysfunction, heart disease, and were referred to the black box warning label for further information.\par \par \par \par I, Kevin Broussard, attest that this documentation has been prepared under the direction and in the presence of Provider Dr. Cleveland\par \par The documentation recorded by the scribe accurately reflects the service I personally performed and the decisions made by me.\par The patient was seen by me under the direct supervision of Dr. Kristian Cleveland\par

## 2022-08-22 NOTE — HISTORY OF PRESENT ILLNESS
[de-identified] : The patient is s/p  right knee arthroscopic partial lateral meniscectomy with decompression of parameniscal cyst\par Date of Surgery: 8/9/22\par Pain:    At Rest: 4/10 \par With Activity:  4/10 \par Mechanism of injury: slipped and fell on water and fell on her left knee\par This is [not] a Work Related Injury being treated under Worker's Compensation.\par This is [not] an athletic injury occurring associated with an interscholastic or organized sports team.\par Treatment/Imaging/Studies Since Last Visit: Surgery, PT\par 	Reports Available For Review Today: ortho-op reports\par Out of work/sport: [Yes], since [Surgery]\par School/Sport/Position/Occupation: dialysis nurse\par Changes since last visit: Doing well, progressing at PT. Pain comes and goes, worse at night and at PT. \par Additional Information: None

## 2022-09-19 ENCOUNTER — APPOINTMENT (OUTPATIENT)
Dept: ORTHOPEDIC SURGERY | Facility: CLINIC | Age: 52
End: 2022-09-19

## 2022-09-19 VITALS — BODY MASS INDEX: 24.74 KG/M2 | HEIGHT: 60 IN | WEIGHT: 126 LBS

## 2022-09-19 DIAGNOSIS — M23.009 CYSTIC MENISCUS, UNSPECIFIED MENISCUS, UNSPECIFIED KNEE: ICD-10-CM

## 2022-09-19 PROCEDURE — 99024 POSTOP FOLLOW-UP VISIT: CPT

## 2022-09-19 NOTE — HISTORY OF PRESENT ILLNESS
[de-identified] : The patient is s/p  right knee arthroscopic partial lateral meniscectomy with decompression of parameniscal cyst\par Date of Surgery: 8/9/22\par Pain:    At Rest: 7/10 \par With Activity:  0/10 \par Mechanism of injury: slipped and fell on water and fell on her left knee\par This is [not] a Work Related Injury being treated under Worker's Compensation.\par This is [not] an athletic injury occurring associated with an interscholastic or organized sports team.\par Treatment/Imaging/Studies Since Last Visit: PT\par 	Reports Available For Review Today: none\par Out of work/sport: [Yes], since [Surgery]\par School/Sport/Position/Occupation: dialysis nurse\par Changes since last visit: Pain is in the anterior lower leg, comes and goes. She states it feels like a 7/10 when it comes on. Has been taking tylenol for relief. \par Additional Information: None

## 2022-09-19 NOTE — PHYSICAL EXAM
[de-identified] : The patient is a well appearing 52 year old F of their stated age.\par \par Surgical site: Right knee\par  \par Incision sites: Well approximated, clean, dry, intact, without drainage, without erythema\par  \par Range of motion: 0-140\par  \par Motor Testin-/5 quad\par  \par Stability Testing: Limited by pain\par  \par Swelling/Effusion: none\par  \par Tenderness to palpation: None\par  \par Provocative testing: Limited by pain\par  \par Right Calf: soft and nontender\par Left Calf: soft and nontender\par  \par Neurovascular Examination: Grossly intact, 2+ distal pulses\par Contralateral Extremity: Examination grossly benign\par \par \par Assessment & Plan: The patient is approximately 6 weeks s/p right knee examination under anesthesia, arthroscopic partial lateral meniscectomy with decompression of parameniscal cyst with interval improvement (22). The patient's post-op plan, protocol and activity modifications have been thoroughly discussed and the patient expressed understanding. Patient will continue physical therapy. Advanced activity as tolerated. They will remain OOW. F/u 4wks for possible work clearance. \par \par \par The patient's current medication management of their orthopedic diagnosis was reviewed today:\par (1) We discussed a comprehensive treatment plan that included possible pharmaceutical management involving the use of prescription strength medications including but not limited to options such as oral Naprosyn 500mg BID, once daily Meloxicam 15 mg, or 500-650 mg Tylenol versus over the counter oral medications and topical prescription NSAID Pennsaid vs over the counter Voltaren gel.\par (2) There is a moderate risk of morbidity with further treatment, especially from use of prescription strength medications and possible side effects of these medications which include upset stomach with oral medications, skin reactions to topical medications and cardiac/renal issues with long term use.\par (3) I recommended that the patient follow-up with their medical physician to discuss any significant specific potential issues with long term medication use such as interactions with current medications or with exacerbation of underlying medical comorbidities.\par (4) The benefits and risks associated with use of injectable, oral or topical, prescription and over the counter anti-inflammatory medications were discussed with the patient. The patient voiced understanding of the risks including but not limited to bleeding, stroke, kidney dysfunction, heart disease, and were referred to the black box warning label for further information.\par \par \par \par I, Kevin Broussard, attest that this documentation has been prepared under the direction and in the presence of Provider Dr. Cleveland\par \par The documentation recorded by the scribe accurately reflects the service I personally performed and the decisions made by me.\par

## 2022-10-17 ENCOUNTER — APPOINTMENT (OUTPATIENT)
Dept: ORTHOPEDIC SURGERY | Facility: CLINIC | Age: 52
End: 2022-10-17
Payer: COMMERCIAL

## 2022-10-17 VITALS — BODY MASS INDEX: 24.74 KG/M2 | HEIGHT: 60 IN | WEIGHT: 126 LBS

## 2022-10-17 DIAGNOSIS — S83.281A OTHER TEAR OF LATERAL MENISCUS, CURRENT INJURY, RIGHT KNEE, INITIAL ENCOUNTER: ICD-10-CM

## 2022-10-17 PROCEDURE — 99024 POSTOP FOLLOW-UP VISIT: CPT

## 2022-10-18 NOTE — PHYSICAL EXAM
[de-identified] : The patient is a well appearing 52 year old F of their stated age.\par \par Surgical site: Right knee\par  \par Incision sites: Well healed\par  \par Range of motion: 0-135\par  \par Motor Testin-/5 quad\par  \par Stability Testing: stable ligamentous examination\par  \par Swelling/Effusion: none\par  \par Tenderness to palpation: None\par  \par Provocative testing: Negative \par  \par Right Calf: soft and nontender\par Left Calf: soft and nontender\par  \par Neurovascular Examination: Grossly intact, 2+ distal pulses\par Contralateral Extremity: Examination grossly benign\par \par \par Assessment & Plan: The patient is approximately 10 weeks s/p right knee examination under anesthesia, arthroscopic partial lateral meniscectomy with decompression of parameniscal cyst with interval improvement (22). The patient's post-op plan, protocol and activity modifications have been thoroughly discussed and the patient expressed understanding. Patient will continue physical therapy, transition to HEP and stretching. Advanced activity as tolerated. Patient will return to work on 2022. F/u PRN\par \par \par The patient's current medication management of their orthopedic diagnosis was reviewed today:\par (1) We discussed a comprehensive treatment plan that included possible pharmaceutical management involving the use of prescription strength medications including but not limited to options such as oral Naprosyn 500mg BID, once daily Meloxicam 15 mg, or 500-650 mg Tylenol versus over the counter oral medications and topical prescription NSAID Pennsaid vs over the counter Voltaren gel.\par (2) There is a moderate risk of morbidity with further treatment, especially from use of prescription strength medications and possible side effects of these medications which include upset stomach with oral medications, skin reactions to topical medications and cardiac/renal issues with long term use.\par (3) I recommended that the patient follow-up with their medical physician to discuss any significant specific potential issues with long term medication use such as interactions with current medications or with exacerbation of underlying medical comorbidities.\par (4) The benefits and risks associated with use of injectable, oral or topical, prescription and over the counter anti-inflammatory medications were discussed with the patient. The patient voiced understanding of the risks including but not limited to bleeding, stroke, kidney dysfunction, heart disease, and were referred to the black box warning label for further information.\par \par \par \par I, Kasia Haddad, attest that this documentation has been prepared under the direction and in the presence of Provider Dr. Kristian Cleveland.\par \par The documentation recorded by the scribe accurately reflects the service I personally performed and the decisions made by me.\par

## 2022-10-18 NOTE — HISTORY OF PRESENT ILLNESS
[de-identified] : The patient is s/p  right knee arthroscopic partial lateral meniscectomy with decompression of parameniscal cyst\par Date of Surgery: 8/9/22\par Pain:    At Rest: 4/10 \par With Activity:  0/10 \par Mechanism of injury: slipped and fell on water and fell on her left knee\par This is [not] a Work Related Injury being treated under Worker's Compensation.\par This is [not] an athletic injury occurring associated with an interscholastic or organized sports team.\par Treatment/Imaging/Studies Since Last Visit: PT\par 	Reports Available For Review Today: none\par Out of work/sport: [Yes], since [Surgery]\par School/Sport/Position/Occupation: dialysis nurse\par Changes since last visit: Improving. Would like to continue PT as she still c/o pain that comes and goes. Would like to RTW november 8th. \par Additional Information: None

## 2022-11-30 ENCOUNTER — FORM ENCOUNTER (OUTPATIENT)
Age: 52
End: 2022-11-30

## 2025-09-10 ENCOUNTER — APPOINTMENT (OUTPATIENT)
Dept: ORTHOPEDIC SURGERY | Facility: CLINIC | Age: 55
End: 2025-09-10

## 2025-09-10 VITALS — WEIGHT: 126 LBS | HEIGHT: 60 IN | BODY MASS INDEX: 24.74 KG/M2

## 2025-09-10 DIAGNOSIS — M65.331 TRIGGER FINGER, RIGHT MIDDLE FINGER: ICD-10-CM

## 2025-09-10 DIAGNOSIS — M54.12 RADICULOPATHY, CERVICAL REGION: ICD-10-CM

## 2025-09-10 DIAGNOSIS — I10 ESSENTIAL (PRIMARY) HYPERTENSION: ICD-10-CM

## 2025-09-10 PROCEDURE — 72040 X-RAY EXAM NECK SPINE 2-3 VW: CPT

## 2025-09-10 PROCEDURE — 99204 OFFICE O/P NEW MOD 45 MIN: CPT | Mod: 25

## 2025-09-10 PROCEDURE — 73130 X-RAY EXAM OF HAND: CPT | Mod: RT

## 2025-09-10 PROCEDURE — 20551 NJX 1 TENDON ORIGIN/INSJ: CPT | Mod: RT

## 2025-09-10 RX ORDER — VALSARTAN 40 MG/1
TABLET, COATED ORAL
Refills: 0 | Status: ACTIVE | COMMUNITY